# Patient Record
Sex: MALE | Race: WHITE | HISPANIC OR LATINO
[De-identification: names, ages, dates, MRNs, and addresses within clinical notes are randomized per-mention and may not be internally consistent; named-entity substitution may affect disease eponyms.]

---

## 2019-04-16 ENCOUNTER — HOSPITAL (OUTPATIENT)
Dept: OTHER | Age: 39
End: 2019-04-16
Attending: FAMILY MEDICINE

## 2019-04-18 ENCOUNTER — HOSPITAL (OUTPATIENT)
Dept: OTHER | Age: 39
End: 2019-04-18
Attending: FAMILY MEDICINE

## 2021-09-18 ENCOUNTER — HOSPITAL ENCOUNTER (OUTPATIENT)
Age: 41
Discharge: HOME | End: 2021-09-18
Payer: COMMERCIAL

## 2021-09-18 VITALS
DIASTOLIC BLOOD PRESSURE: 60 MMHG | WEIGHT: 177 LBS | BODY MASS INDEX: 27.78 KG/M2 | TEMPERATURE: 100 F | RESPIRATION RATE: 20 BRPM | HEART RATE: 84 BPM | HEIGHT: 67 IN | OXYGEN SATURATION: 97 % | SYSTOLIC BLOOD PRESSURE: 130 MMHG

## 2021-09-18 DIAGNOSIS — U07.1 COVID-19 VIRUS INFECTION: Primary | ICD-10-CM

## 2021-09-18 LAB — SARS-COV-2 RNA RESP QL NAA+PROBE: DETECTED

## 2021-09-18 PROCEDURE — 99204 OFFICE O/P NEW MOD 45 MIN: CPT

## 2021-09-18 PROCEDURE — 99203 OFFICE O/P NEW LOW 30 MIN: CPT

## 2021-09-18 NOTE — ED PROVIDER NOTES
Patient Seen in: [facility]      History   Patient presents with:  Fever    Stated Complaint: Covid symptoms    Subjective:   HPI    This is a 78-year-old male presenting for body aches fever chills and sweats.   Patient states, for 6 days he Refill: Capillary refill takes less than 2 seconds. Neurological:      General: No focal deficit present. Mental Status: He is alert and oriented to person, place, and time.    Psychiatric:         Mood and Affect: Mood normal.             ED Course sharing personal items, clean and disinfect “high touch” surfaces, and frequent handwashing) according to CDC guidelines. \"                                 Disposition and Plan     Clinical Impression:  COVID-19 virus infection  (primary encounter diagnosi

## 2021-09-18 NOTE — ED INITIAL ASSESSMENT (HPI)
Day 6 of body aches, fever, chills, and sweats. Intermittent dizziness. No SOB. Unvaccinated for Covid.

## 2021-09-20 ENCOUNTER — TELEPHONE (OUTPATIENT)
Dept: CASE MANAGEMENT | Age: 41
End: 2021-09-20

## (undated) NOTE — LETTER
[facility] [place] [address] [facility] [address]  [phone]     Patient: Jeanette Canmisty   YOB: 1980   Date of Visit: 9/18/2021     Dear Employer,        September 18, 2021    At [facility], we are taking special precauti isolation and other precautions 10 days after the date of their first positive RT-PCR test for SARS-CoV-2 RNA.     Persons who are asymptomatic but have been exposed, CDC recommends 10 days of quarantine after exposure based on the time it takes to develop